# Patient Record
Sex: FEMALE | Race: NATIVE HAWAIIAN OR OTHER PACIFIC ISLANDER
[De-identification: names, ages, dates, MRNs, and addresses within clinical notes are randomized per-mention and may not be internally consistent; named-entity substitution may affect disease eponyms.]

---

## 2018-08-26 ENCOUNTER — HOSPITAL ENCOUNTER (EMERGENCY)
Dept: HOSPITAL 31 - C.ER | Age: 33
Discharge: HOME | End: 2018-08-26
Payer: COMMERCIAL

## 2018-08-26 VITALS
SYSTOLIC BLOOD PRESSURE: 99 MMHG | DIASTOLIC BLOOD PRESSURE: 65 MMHG | HEART RATE: 68 BPM | TEMPERATURE: 98.4 F | RESPIRATION RATE: 16 BRPM

## 2018-08-26 VITALS — OXYGEN SATURATION: 99 %

## 2018-08-26 DIAGNOSIS — R51: Primary | ICD-10-CM

## 2018-08-26 LAB
ALBUMIN SERPL-MCNC: 4 G/DL (ref 3.5–5)
ALBUMIN/GLOB SERPL: 1.4 {RATIO} (ref 1–2.1)
ALT SERPL-CCNC: 23 U/L (ref 9–52)
AST SERPL-CCNC: 18 U/L (ref 14–36)
BACTERIA #/AREA URNS HPF: (no result) /[HPF]
BASOPHILS # BLD AUTO: 0 K/UL (ref 0–0.2)
BASOPHILS NFR BLD: 0.6 % (ref 0–2)
BILIRUB UR-MCNC: NEGATIVE MG/DL
BUN SERPL-MCNC: 8 MG/DL (ref 7–17)
CALCIUM SERPL-MCNC: 8.9 MG/DL (ref 8.6–10.4)
EOSINOPHIL # BLD AUTO: 0.1 K/UL (ref 0–0.7)
EOSINOPHIL NFR BLD: 1 % (ref 0–4)
ERYTHROCYTE [DISTWIDTH] IN BLOOD BY AUTOMATED COUNT: 16.6 % (ref 11.5–14.5)
GFR NON-AFRICAN AMERICAN: > 60
GLUCOSE UR STRIP-MCNC: NORMAL MG/DL
HCG,QUALITATIVE URINE: NEGATIVE
HGB BLD-MCNC: 11.5 G/DL (ref 11–16)
LEUKOCYTE ESTERASE UR-ACNC: (no result) LEU/UL
LYMPHOCYTES # BLD AUTO: 2.2 K/UL (ref 1–4.3)
LYMPHOCYTES NFR BLD AUTO: 34.8 % (ref 20–40)
MCH RBC QN AUTO: 26.2 PG (ref 27–31)
MCHC RBC AUTO-ENTMCNC: 33.6 G/DL (ref 33–37)
MCV RBC AUTO: 77.9 FL (ref 81–99)
MONOCYTES # BLD: 0.5 K/UL (ref 0–0.8)
MONOCYTES NFR BLD: 7.8 % (ref 0–10)
NEUTROPHILS # BLD: 3.5 K/UL (ref 1.8–7)
NEUTROPHILS NFR BLD AUTO: 55.8 % (ref 50–75)
NRBC BLD AUTO-RTO: 0 % (ref 0–2)
PH UR STRIP: 7 [PH] (ref 5–8)
PLATELET # BLD: 251 K/UL (ref 130–400)
PMV BLD AUTO: 8.2 FL (ref 7.2–11.7)
PROT UR STRIP-MCNC: NEGATIVE MG/DL
RBC # BLD AUTO: 4.41 MIL/UL (ref 3.8–5.2)
RBC # UR STRIP: (no result) /UL
SP GR UR STRIP: 1.01 (ref 1–1.03)
SQUAMOUS EPITHIAL: 14 /HPF (ref 0–5)
UROBILINOGEN UR-MCNC: NORMAL MG/DL (ref 0.2–1)
WBC # BLD AUTO: 6.3 K/UL (ref 4.8–10.8)

## 2018-08-26 PROCEDURE — 96361 HYDRATE IV INFUSION ADD-ON: CPT

## 2018-08-26 PROCEDURE — 96374 THER/PROPH/DIAG INJ IV PUSH: CPT

## 2018-08-26 PROCEDURE — 85025 COMPLETE CBC W/AUTO DIFF WBC: CPT

## 2018-08-26 PROCEDURE — 99285 EMERGENCY DEPT VISIT HI MDM: CPT

## 2018-08-26 PROCEDURE — 84703 CHORIONIC GONADOTROPIN ASSAY: CPT

## 2018-08-26 PROCEDURE — 80053 COMPREHEN METABOLIC PANEL: CPT

## 2018-08-26 PROCEDURE — 70450 CT HEAD/BRAIN W/O DYE: CPT

## 2018-08-26 PROCEDURE — 81001 URINALYSIS AUTO W/SCOPE: CPT

## 2018-08-26 NOTE — CT
Date of service: 



08/26/2018



PROCEDURE:  CT HEAD WITHOUT CONTRAST.



HISTORY:

Headache



COMPARISON:

None available.



TECHNIQUE:

Axial computed tomography images were obtained through the head/brain 

without intravenous contrast.  



Radiation dose:



Total exam DLP = 798.08 mGy-cm.



This CT exam was performed using one or more of the following dose 

reduction techniques: Automated exposure control, adjustment of the 

mA and/or kV according to patient size, and/or use of iterative 

reconstruction technique.



FINDINGS:



HEMORRHAGE:

No intracranial hemorrhage. 



BRAIN:

Normal gray-white matter differentiation and density are appreciated 

throughout the cerebrum and cerebellum with the brainstem appearing 

unremarkable as well.  There is no mass effect.  There is no 

suspicious extra-axial fluid collection and the midline brain anatomy 

appears diffusely unremarkable.  



VENTRICLES:

Unremarkable. No hydrocephalus. 



CALVARIUM:

Unremarkable.



PARANASAL SINUSES:

Unremarkable as visualized. No significant inflammatory changes.



MASTOID AIR CELLS:

Unremarkable as visualized. No inflammatory changes.



OTHER FINDINGS:

None.



IMPRESSION:

Unremarkable noncontrast head CT.

## 2018-08-26 NOTE — C.PDOC
History Of Present Illness


32yo female, comes in to ED with complaints of headache. Pt has a Hx of 

headaches over the past few months. She is complaining of pain to right side of 

head, associated with photophobia. Denies dizziness, numbness/weakness, or any 

other associated symptoms No other complaints at this time.


Time Seen by Provider: 08/26/18 12:15


Chief Complaint (Nursing): Headache


History Per: Patient


History/Exam Limitations: no limitations


Onset/Duration Of Symptoms: Waxing/Waning


Severity: Moderate





Past Medical History


Reviewed: Historical Data, Nursing Documentation, Vital Signs


Vital Signs: 


 Last Vital Signs











Temp  98.4 F   08/26/18 14:47


 


Pulse  68   08/26/18 14:47


 


Resp  16   08/26/18 14:47


 


BP  99/65 L  08/26/18 14:47


 


Pulse Ox  99   08/26/18 18:24














- Medical History


PMH: Depression


Family History: States: No Known Family Hx





- Social History


Hx Alcohol Use: No


Hx Substance Use: No





- Immunization History


Hx Tetanus Toxoid Vaccination: Yes


Hx Influenza Vaccination: No


Hx Pneumococcal Vaccination: No





Review Of Systems


Constitutional: Negative for: Fever, Chills


Respiratory: Negative for: Shortness of Breath


Gastrointestinal: Negative for: Nausea, Vomiting


Neurological: Positive for: Headache.  Negative for: Weakness, Numbness, 

Dizziness





Physical Exam





- Physical Exam


Appears: Non-toxic, No Acute Distress, Other (uncomfortable)


Skin: Normal Color, Warm, Dry, No Rash


Head: Atraumatic, Normacephalic


Eye(s): bilateral: Normal Inspection, PERRL, EOMI


Nose: Normal


Oral Mucosa: Moist


Lips: Normal Appearing


Neck: Normal ROM


Chest: Symmetrical


Cardiovascular: Rhythm Regular, No Murmur


Respiratory: Normal Breath Sounds, No Accessory Muscle Use


Gastrointestinal/Abdominal: Soft, No Tenderness


Extremity: Normal ROM, No Deformity


Neurological/Psych: Oriented x3, Normal Speech





ED Course And Treatment





- Laboratory Results


Result Diagrams: 


 08/26/18 13:17





 08/26/18 13:17


O2 Sat by Pulse Oximetry: 99 (RA)


Pulse Ox Interpretation: Normal





- CT Scan/US


  ** Head CT


Other Rad Studies (CT/US): Read By Radiologist, Radiology Report Reviewed


CT/US Interpretation: Accession No. : [id number].  Patient Name / ID : 

LETI ODEN  / [id number].  Exam Date : 08/24/2018 23:43:32 ( Approved ).  Study 

Comment :  Sex / Age : M  / 30Y.  Creator : damien olson.  Dictator : 

Damien Gallagher MD.   :  Approver : Damien Gallagher MD.  

Approver2 :  Report Date : 08/25/2018 01:43:20.  My Comment :  *****************

******************************************************************.  Date of 

service:  08/24/2018.  PROCEDURE:  CT LEFT FOOT WITHOUT CONTRAST.  HISTORY:  pt 

with h/o muscle distrophy with new injury,.  COMPARISON:  Left foot radiographs 

08/24/2018.  TECHNIQUE:  A volumetric CT acquisition of the left foot is in 

performed without intravenous contrast as requested with multiplanar 

reformatted datasets provided for interpretation by various techniques.  

Contrast Dose: None.  Radiation dose:Total exam DLP = 329.78 mGy-cm.  This CT 

exam was performed using one or more of the following dose reduction techniques

: Automated exposure control, adjustment of the mA and/or kV according to 

patient size, and/or use of iterative reconstruction technique.  FINDINGS:  

Diffuse osteopenia suggests osteoporosis.  No fracture, subluxation or 

dislocation.  Marked pes cavus deformity reiterated.  Foreshortening of the 

left 4th metatarsal bone reiterated. Local soft tissues appear diffusely 

unremarkable. No retained radiodense foreign body appreciable.  IMPRESSION:  No 

acute fracture identified, subluxation or dislocation.  Prominent pes cavus 

deformity appreciated. Diffuse osteopenia suggests osteoporosis.  Concordant 

preliminary report from [facility], 08/25/2018.





Medical Decision Making


Medical Decision Making: 





Patient was treated with IVF and Reglan IV with improvement. Patient is stable 

to be d/c home with PMD and Neurologist follow up.





Disposition





- Disposition


Referrals: 


Veda Rosado MD [IM] - 


Willis Meehan MD [Staff Provider] - 


Disposition: HOME/ ROUTINE


Disposition Time: 15:17


Condition: STABLE


Additional Instructions: 


Follow up within 1-2 days. Return to ED if feel worse.


Prescriptions: 


Acetaminophen/Butalbital/Caf [Fioricet] 1 tab PO TID PRN #20 tab


 PRN Reason: Headache


Instructions:  Headache, Adult


Forms:  [facility] (English)





- Clinical Impression


Clinical Impression: 


 Headache








- Scribe Statement


The provider has reviewed the documentation as recorded by the Scribe (Meliza Lang)








All medical record entries made by the Scribe were at my direction and 

personally dictated by me. I have reviewed the chart and agree that the record 

accurately reflects my personal performance of the history, physical exam, 

medical decision making, and the department course for this patient. I have 

also personally directed, reviewed, and agree with the discharge instructions 

and disposition.

## 2019-01-13 ENCOUNTER — HOSPITAL ENCOUNTER (EMERGENCY)
Dept: HOSPITAL 31 - C.ER | Age: 34
Discharge: HOME | End: 2019-01-13
Payer: COMMERCIAL

## 2019-01-13 VITALS
TEMPERATURE: 99.3 F | RESPIRATION RATE: 18 BRPM | OXYGEN SATURATION: 100 % | SYSTOLIC BLOOD PRESSURE: 101 MMHG | DIASTOLIC BLOOD PRESSURE: 69 MMHG | HEART RATE: 99 BPM

## 2019-01-13 DIAGNOSIS — J02.9: Primary | ICD-10-CM

## 2019-01-13 NOTE — C.PDOC
History Of Present Illness


33 year old female presents to the emergency department with complaints of sore 

throat and fever for the last two days. Patient denies cough, vomiting,and 

diarrhea. 


Time Seen by Provider: 01/13/19 09:55


Chief Complaint (Nursing): ENT Problem


History Per: Patient


History/Exam Limitations: None


Onset/Duration Of Symptoms: Days (2)


Current Symptoms Are (Timing): Still Present


Quality (Mouth/Throat): Other (soreness)





Past Medical History


Reviewed: Historical Data, Nursing Documentation, Vital Signs


Vital Signs: 





                                Last Vital Signs











Temp  99.3 F   01/13/19 09:31


 


Pulse  99 H  01/13/19 09:31


 


Resp  18   01/13/19 09:31


 


BP  101/69   01/13/19 09:31


 


Pulse Ox  100   01/13/19 09:31














- Medical History


PMH: Depression


Surgical History: No Surg Hx


Family History: States: No Known Family Hx





- Social History


Hx Alcohol Use: No


Hx Substance Use: No





- Immunization History


Hx Tetanus Toxoid Vaccination: Yes


Hx Influenza Vaccination: No


Hx Pneumococcal Vaccination: No





Review Of Systems


Except As Marked, All Systems Reviewed And Found Negative.


Constitutional: Positive for: Fever


ENT: Positive for: Throat Pain





Physical Exam





- Physical Exam


Appears: Non-toxic, No Acute Distress


Skin: Normal Color, Warm, Dry


Head: Atraumatic, Normacephalic


Eye(s): bilateral: Normal Inspection, PERRL, EOMI


Nose: Normal


Oral Mucosa: Moist


Throat: Erythema, Exudate, Other (hypertrophy)


Neck: Normal, Supple


Lymphatic: Adenopathy (positive tender anterior cervical lymphadenopathy)


Cardiovascular: Rhythm Regular, No Murmur


Respiratory: Normal Breath Sounds, No Rales, No Rhonchi, No Wheezing


Neurological/Psych: Oriented x3, Normal Speech, Normal Cognition





ED Course And Treatment


O2 Sat by Pulse Oximetry: 100 (RA)





Medical Decision Making


Medical Decision Making: 


Plan:


Amoxil 500mg PO


Motrin 600mg PO


POC Urine Pregnancy





Assessment: Pharyngitis





Disposition


Counseled Patient/Family Regarding: Diagnosis, Need For Followup, Rx Given





- Disposition


Referrals: 


Veda Rosado MD [IM] - 


Disposition: HOME/ ROUTINE


Disposition Time: 10:14


Condition: STABLE


Additional Instructions: 


follow up with your doctor within 2 days


call to make an appointment


take medications as prescribed


return to ER if symptoms worsens or progres


Prescriptions: 


Amoxicillin 875 mg PO BID 10 Days #20 tablet


Naproxen [Naprosyn] 500 mg PO BID PRN #16 tab


 PRN Reason: Pain, Moderate (4-7)


Instructions:  Sore Throat, Adult (DC)


Forms:  Gen Discharge Inst Yoruba, [facility] (English), Work Excuse





- Clinical Impression


Clinical Impression: 


 Pharyngitis








- Scribe Statement


The provider has reviewed the documentation as recorded by the Scribe (Leo Perez)


Provider Attestation: 


All medical record entries made by the Scribe were at my direction and 

personally dictated by me. I have reviewed the chart and agree that the record 

accurately reflects my personal performance of the history, physical exam, 

medical decision making, and the department course for this patient. I have also

 personally directed, reviewed, and agree with the discharge instructions and 

disposition.